# Patient Record
Sex: MALE | Race: WHITE | Employment: UNEMPLOYED | URBAN - METROPOLITAN AREA
[De-identification: names, ages, dates, MRNs, and addresses within clinical notes are randomized per-mention and may not be internally consistent; named-entity substitution may affect disease eponyms.]

---

## 2018-04-19 ENCOUNTER — OFFICE VISIT (OUTPATIENT)
Dept: FAMILY MEDICINE CLINIC | Facility: CLINIC | Age: 23
End: 2018-04-19

## 2018-04-19 VITALS
BODY MASS INDEX: 24.08 KG/M2 | SYSTOLIC BLOOD PRESSURE: 112 MMHG | WEIGHT: 172 LBS | HEART RATE: 96 BPM | TEMPERATURE: 98 F | HEIGHT: 71 IN | DIASTOLIC BLOOD PRESSURE: 76 MMHG | OXYGEN SATURATION: 96 %

## 2018-04-19 DIAGNOSIS — G47.10 HYPERSOMNIA: Primary | ICD-10-CM

## 2018-04-19 DIAGNOSIS — D58.2 ELEVATED HEMOGLOBIN (HCC): ICD-10-CM

## 2018-04-19 DIAGNOSIS — D72.10 EOSINOPHILIA: ICD-10-CM

## 2018-04-19 DIAGNOSIS — F90.8 ATTENTION DEFICIT HYPERACTIVITY DISORDER (ADHD), OTHER TYPE: ICD-10-CM

## 2018-04-19 DIAGNOSIS — A44.9 BARTONELLA INFECTION: ICD-10-CM

## 2018-04-19 DIAGNOSIS — R53.83 OTHER FATIGUE: ICD-10-CM

## 2018-04-19 DIAGNOSIS — F41.9 ANXIETY: ICD-10-CM

## 2018-04-19 PROBLEM — F98.8 ATTENTION DEFICIT DISORDER: Status: ACTIVE | Noted: 2018-04-19

## 2018-04-19 PROCEDURE — 99203 OFFICE O/P NEW LOW 30 MIN: CPT | Performed by: FAMILY MEDICINE

## 2018-04-19 RX ORDER — MIRTAZAPINE 15 MG/1
15 TABLET, FILM COATED ORAL NIGHTLY
COMMUNITY
End: 2018-11-20

## 2018-04-19 RX ORDER — BUSPIRONE HYDROCHLORIDE 10 MG/1
20 TABLET ORAL 2 TIMES DAILY
COMMUNITY
End: 2018-10-19 | Stop reason: ALTCHOICE

## 2018-04-19 RX ORDER — MAGNESIUM 200 MG
200 TABLET ORAL 3 TIMES DAILY
COMMUNITY

## 2018-04-19 RX ORDER — WITCH HAZEL 50 %
500 PADS, MEDICATED (EA) TOPICAL 2 TIMES DAILY
COMMUNITY
End: 2021-11-02

## 2018-04-19 RX ORDER — CHOLECALCIFEROL (VITAMIN D3) 50 MCG
2000 TABLET ORAL ONCE
COMMUNITY

## 2018-04-19 RX ORDER — AMOXICILLIN 500 MG/1
500 CAPSULE ORAL AS NEEDED
COMMUNITY
End: 2018-10-19 | Stop reason: ALTCHOICE

## 2018-04-19 RX ORDER — HYDROCORTISONE 5 MG/1
5 TABLET ORAL 2 TIMES DAILY
COMMUNITY
End: 2018-10-19 | Stop reason: ALTCHOICE

## 2018-04-19 RX ORDER — OXCARBAZEPINE 150 MG/1
150 TABLET, FILM COATED ORAL 2 TIMES DAILY
COMMUNITY
End: 2018-12-12

## 2018-04-19 RX ORDER — SERTRALINE HYDROCHLORIDE 100 MG/1
150 TABLET, FILM COATED ORAL DAILY
COMMUNITY
End: 2018-11-20

## 2018-04-19 RX ORDER — LEVOTHYROXINE SODIUM 0.1 MG/1
5 TABLET ORAL DAILY
COMMUNITY
End: 2018-10-19

## 2018-04-19 RX ORDER — CLONAZEPAM 1 MG/1
1 TABLET ORAL NIGHTLY
COMMUNITY
End: 2018-10-19 | Stop reason: ALTCHOICE

## 2018-04-19 RX ORDER — GABAPENTIN 100 MG/1
600 CAPSULE ORAL 3 TIMES DAILY
COMMUNITY
End: 2018-11-20

## 2018-04-19 RX ORDER — CYANOCOBALAMIN 1000 UG/ML
1000 INJECTION INTRAMUSCULAR; SUBCUTANEOUS
COMMUNITY

## 2018-04-19 RX ORDER — MULTIVIT WITH MINERALS/LUTEIN
1000 TABLET ORAL DAILY
COMMUNITY

## 2018-04-19 RX ORDER — LORAZEPAM 0.5 MG/1
0.5 TABLET ORAL 3 TIMES DAILY
COMMUNITY
End: 2018-12-12

## 2018-04-19 NOTE — PROGRESS NOTES
HPI: 23year old male presents for f/u on anxiety, ADD, hypersomnia, Bartonella infection (NP). VSS.  Sees Pulmonology: Dr. Troy Ferreira for hypersomnia-thought to be 2/t multifactorial causes (depression/inadequate sleep time, poor sleep hygeine)-last see SYSTEMS:  As stated above in the HPI, significant for intermittent fever/chills, mood swings, fatigue.  Patient has otherwise been well without abd pain, C.P., SOB, cough/wheezing, runny nose or frequent sneezing/itchy eyes, heartburn, polyuria, polydipsia,

## 2018-04-19 NOTE — PATIENT INSTRUCTIONS
PLAN:  -referral to Hematology:  Dr. Cynthia Seay  [phone]   [place]  Phone: [phone]  Fax: [phone]    -f/u as needed

## 2018-04-24 ENCOUNTER — HOSPITAL ENCOUNTER (EMERGENCY)
Facility: HOSPITAL | Age: 23
Discharge: STILL ADMITTED OR ADMINISTRATIVE | End: 2018-04-24
Payer: COMMERCIAL

## 2018-04-25 ENCOUNTER — TELEPHONE (OUTPATIENT)
Dept: FAMILY MEDICINE CLINIC | Facility: CLINIC | Age: 23
End: 2018-04-25

## 2018-04-25 NOTE — TELEPHONE ENCOUNTER
Pt's  fax blood test results.  I did forward them to Dr Shari Walter with Pt verbal permission  L:[phone]

## 2018-04-27 ENCOUNTER — OFFICE VISIT (OUTPATIENT)
Dept: HEMATOLOGY/ONCOLOGY | Facility: HOSPITAL | Age: 23
End: 2018-04-27
Attending: INTERNAL MEDICINE
Payer: COMMERCIAL

## 2018-04-27 ENCOUNTER — LAB ENCOUNTER (OUTPATIENT)
Dept: LAB | Facility: HOSPITAL | Age: 23
End: 2018-04-27
Attending: INTERNAL MEDICINE
Payer: COMMERCIAL

## 2018-04-27 ENCOUNTER — HOSPITAL ENCOUNTER (OUTPATIENT)
Dept: GENERAL RADIOLOGY | Facility: HOSPITAL | Age: 23
Discharge: HOME | End: 2018-04-27
Attending: INTERNAL MEDICINE
Payer: COMMERCIAL

## 2018-04-27 VITALS
DIASTOLIC BLOOD PRESSURE: 69 MMHG | WEIGHT: 174 LBS | BODY MASS INDEX: 24.36 KG/M2 | TEMPERATURE: 98 F | HEIGHT: 71 IN | HEART RATE: 92 BPM | SYSTOLIC BLOOD PRESSURE: 115 MMHG | RESPIRATION RATE: 18 BRPM

## 2018-04-27 DIAGNOSIS — D75.1 ERYTHROCYTOSIS: ICD-10-CM

## 2018-04-27 DIAGNOSIS — D75.1 ERYTHROCYTOSIS: Primary | ICD-10-CM

## 2018-04-27 PROCEDURE — 85060 BLOOD SMEAR INTERPRETATION: CPT

## 2018-04-27 PROCEDURE — 85025 COMPLETE CBC W/AUTO DIFF WBC: CPT

## 2018-04-27 PROCEDURE — 82668 ASSAY OF ERYTHROPOIETIN: CPT

## 2018-04-27 PROCEDURE — 84403 ASSAY OF TOTAL TESTOSTERONE: CPT

## 2018-04-27 PROCEDURE — 81270 JAK2 GENE: CPT

## 2018-04-27 PROCEDURE — 71046 X-RAY EXAM CHEST 2 VIEWS: CPT | Performed by: INTERNAL MEDICINE

## 2018-04-27 PROCEDURE — 99244 OFF/OP CNSLTJ NEW/EST MOD 40: CPT | Performed by: INTERNAL MEDICINE

## 2018-04-27 PROCEDURE — 84402 ASSAY OF FREE TESTOSTERONE: CPT

## 2018-04-27 PROCEDURE — 81003 URINALYSIS AUTO W/O SCOPE: CPT

## 2018-04-27 PROCEDURE — 36415 COLL VENOUS BLD VENIPUNCTURE: CPT

## 2018-04-27 PROCEDURE — 80053 COMPREHEN METABOLIC PANEL: CPT

## 2018-04-27 PROCEDURE — 85045 AUTOMATED RETICULOCYTE COUNT: CPT

## 2018-04-27 RX ORDER — ARTEMETHER/LUMEFANTRINE 20MG-120MG
TABLET ORAL
COMMUNITY
Start: 2018-04-23 | End: 2018-12-12

## 2018-04-27 NOTE — PROGRESS NOTES
Hematology Consultation Note    Patient Name: Luis Herman   YOB: 1995   Medical Record Number: [id number]   CSN: [id number]   Consulting Physician: Cynthia Seay MD  Referring Physician(s): Sharon Guo  Date of Consultation: 4/27/201 Clotting Disorder Neg        Social History:    Social History  Social History   Marital status: Single  Spouse name: N/A    Years of education: N/A  Number of children: N/A     Occupational History  None on file     Social History Main Topics   Smoking st is alert and oriented x 3, not in acute distress. Psych: Cooperative with appropriate questions and responses. HEENT: EOMs intact. Oropharynx is clear. Neck:  No palpable lymphadenopathy. Neck is supple. Lymphatics:  There is no palpable lymphadenopath erythropoietin by checking UA and chest x-ray as well as erythropoietin level    --We are checking reticulocyte count to determine if his bone marrow is overactive and producing more ertyhrocytes  --Informed patient we will check for potential myeloprolife

## 2018-05-11 ENCOUNTER — APPOINTMENT (OUTPATIENT)
Dept: LAB | Facility: HOSPITAL | Age: 23
End: 2018-05-11
Attending: INTERNAL MEDICINE
Payer: COMMERCIAL

## 2018-05-11 ENCOUNTER — OFFICE VISIT (OUTPATIENT)
Dept: HEMATOLOGY/ONCOLOGY | Facility: HOSPITAL | Age: 23
End: 2018-05-11
Attending: INTERNAL MEDICINE
Payer: COMMERCIAL

## 2018-05-11 VITALS
DIASTOLIC BLOOD PRESSURE: 59 MMHG | TEMPERATURE: 98 F | RESPIRATION RATE: 16 BRPM | WEIGHT: 176 LBS | SYSTOLIC BLOOD PRESSURE: 121 MMHG | BODY MASS INDEX: 24.64 KG/M2 | HEIGHT: 71 IN | HEART RATE: 82 BPM

## 2018-05-11 DIAGNOSIS — D75.1 ERYTHROCYTOSIS: ICD-10-CM

## 2018-05-11 DIAGNOSIS — D75.1 ERYTHROCYTOSIS: Primary | ICD-10-CM

## 2018-05-11 PROCEDURE — 83615 LACTATE (LD) (LDH) ENZYME: CPT

## 2018-05-11 PROCEDURE — 84550 ASSAY OF BLOOD/URIC ACID: CPT

## 2018-05-11 PROCEDURE — 36415 COLL VENOUS BLD VENIPUNCTURE: CPT

## 2018-05-11 PROCEDURE — 99214 OFFICE O/P EST MOD 30 MIN: CPT | Performed by: INTERNAL MEDICINE

## 2018-05-11 NOTE — PROGRESS NOTES
Hematology Progress Note    Patient Name: Shery Blizzard   YOB: 1995   Medical Record Number: [id number]   CSN: [id number]  Consulting Physician: Shima Jones MD  Referring Physician(s): Olga Fagan  Date of Visit: 5/11/2018     Reason Medical History:  Family History   Problem Relation Age of Onset   • Anemia Mother    • Breast Cancer Paternal Grandmother    • Cancer Paternal Cousin Female      etiology unclear; involving her eye   • Bleeding Disorders Neg    • Clotting Disorder Neg Injection Solution Inject 1,000 mcg into the muscle twice a week. Disp:  Rfl:    5-Methyltetrahydrofolate (METHYL FOLATE) Does not apply Powder by Does not apply route 2 (two) times daily.  Disp:  Rfl:    Multiple Vitamins-Minerals (Joe Ceballos) Oral Tab [facility] Cooperative with appropriate questions and responses. HEENT: EOMs intact. Oropharynx is clear. Neck:  No palpable lymphadenopathy. Neck is supple. Lymphatics:  There is no palpable lymphadenopathy throughout in the cervical, supraclavicular, axillary, o eosinophils; which is likely secondary to the host of medications this patient is being treated with including steroids  --NO signs of potential masses which could be secreting erythropoietin via UA and chest x-ray as well as erythropoietin level is within general treatment was explained to the patient and the family. Crispin López MD  [place] [address] [facility]  [address], [address]

## 2018-10-19 ENCOUNTER — OFFICE VISIT (OUTPATIENT)
Dept: FAMILY MEDICINE CLINIC | Facility: CLINIC | Age: 23
End: 2018-10-19
Payer: COMMERCIAL

## 2018-10-19 VITALS
SYSTOLIC BLOOD PRESSURE: 110 MMHG | WEIGHT: 168.19 LBS | HEIGHT: 71 IN | BODY MASS INDEX: 23.55 KG/M2 | HEART RATE: 99 BPM | DIASTOLIC BLOOD PRESSURE: 64 MMHG | TEMPERATURE: 98 F | RESPIRATION RATE: 17 BRPM

## 2018-10-19 DIAGNOSIS — R53.83 OTHER FATIGUE: Primary | ICD-10-CM

## 2018-10-19 DIAGNOSIS — A44.9 BARTONELLA INFECTION: ICD-10-CM

## 2018-10-19 DIAGNOSIS — D75.1 ERYTHROCYTOSIS: ICD-10-CM

## 2018-10-19 DIAGNOSIS — R79.89 ABNORMAL TSH: ICD-10-CM

## 2018-10-19 DIAGNOSIS — F41.9 ANXIETY: ICD-10-CM

## 2018-10-19 DIAGNOSIS — F39 MOOD DISORDER (HCC): ICD-10-CM

## 2018-10-19 PROCEDURE — 99214 OFFICE O/P EST MOD 30 MIN: CPT | Performed by: FAMILY MEDICINE

## 2018-10-19 RX ORDER — FLUTICASONE PROPIONATE 0.05 MG/G
OINTMENT TOPICAL 2 TIMES DAILY
COMMUNITY
End: 2018-12-12

## 2018-10-19 RX ORDER — LEVOTHYROXINE SODIUM 0.05 MG/1
50 TABLET ORAL DAILY
Qty: 90 TABLET | Refills: 0 | COMMUNITY
Start: 2018-10-19 | End: 2018-12-12

## 2018-10-19 RX ORDER — LIOTHYRONINE SODIUM 5 UG/1
5 TABLET ORAL DAILY
Qty: 90 TABLET | Refills: 0 | COMMUNITY
Start: 2018-10-19 | End: 2018-12-12

## 2018-10-19 NOTE — PATIENT INSTRUCTIONS
PLAN:  -check thyroid function    -referral to Integrative Medicine (Dr. Leo Nunn)    -I recommend Dr. Palak Allen (Psychiatry) in [facility]    -taper down off oral Hydrocortisone  -check labs 1 week after you have stopped Hydrocortisone completely: CBC/CMP

## 2018-10-19 NOTE — PROGRESS NOTES
HPI: 23year old male presents c/o Patient presents with:  Fatigue  Follow - Up  Psychiatric Problem: Pt would like to discuss changing psychiatric provider    VSS. F/u on erythrocytosis-patient saw Heme-w/u negative-discussed with patient.  Latest labs pat polydipsia, heat or cold intolerance, recent weight changes, rashes, lesions. Further review is otherwise negative.      10/19/18  1537   BP: 110/64   Pulse: 99   Resp: 17   Temp: 98.1 °F (36.7 °C)   TempSrc: Oral   Weight: 168 lb 3.2 oz   Height: 71\" after you have stopped Hydrocortisone completely: CBC/CMP    -f/u as needed

## 2018-11-19 ENCOUNTER — LAB ENCOUNTER (OUTPATIENT)
Dept: LAB | Age: 23
End: 2018-11-19
Attending: FAMILY MEDICINE
Payer: COMMERCIAL

## 2018-11-19 ENCOUNTER — TELEPHONE (OUTPATIENT)
Dept: FAMILY MEDICINE CLINIC | Facility: CLINIC | Age: 23
End: 2018-11-19

## 2018-11-19 DIAGNOSIS — R79.89 ABNORMAL TSH: ICD-10-CM

## 2018-11-19 DIAGNOSIS — D58.2 ELEVATED HEMOGLOBIN (HCC): ICD-10-CM

## 2018-11-19 DIAGNOSIS — R53.83 OTHER FATIGUE: Primary | ICD-10-CM

## 2018-11-19 DIAGNOSIS — R53.83 OTHER FATIGUE: ICD-10-CM

## 2018-11-19 DIAGNOSIS — D72.10 EOSINOPHILIA: ICD-10-CM

## 2018-11-19 PROCEDURE — 85025 COMPLETE CBC W/AUTO DIFF WBC: CPT

## 2018-11-19 PROCEDURE — 80053 COMPREHEN METABOLIC PANEL: CPT

## 2018-11-19 PROCEDURE — 84439 ASSAY OF FREE THYROXINE: CPT

## 2018-11-19 PROCEDURE — 82533 TOTAL CORTISOL: CPT

## 2018-11-19 PROCEDURE — 84443 ASSAY THYROID STIM HORMONE: CPT

## 2018-11-19 PROCEDURE — 36415 COLL VENOUS BLD VENIPUNCTURE: CPT

## 2018-11-19 NOTE — TELEPHONE ENCOUNTER
Called patient, he states he would like this checked per Dr. Jaquelyn Bosworth recommendation;  Patient previously taking po hydrocortisone, now weaning off.     Per lov 10/19/2018  PLAN:  -check thyroid function     -referral to Integrative Medicine (Dr. Yariel Holt)

## 2018-11-20 PROBLEM — G47.10 HYPERSOMNIA WITH SLEEP APNEA: Status: ACTIVE | Noted: 2018-04-19

## 2018-11-20 PROBLEM — G47.30 HYPERSOMNIA WITH SLEEP APNEA: Status: ACTIVE | Noted: 2018-04-19

## 2018-11-26 ENCOUNTER — TELEPHONE (OUTPATIENT)
Dept: FAMILY MEDICINE CLINIC | Facility: CLINIC | Age: 23
End: 2018-11-26

## 2018-11-26 NOTE — TELEPHONE ENCOUNTER
Spoke to pt, informed him of lab results and recommendation to have additional cortisol test between 8-9am; pt verbalizes understanding and will have lab drawn between 8-9am.

## 2018-11-26 NOTE — TELEPHONE ENCOUNTER
----- Message from Shelli Loving DO sent at 11/20/2018  3:07 PM CST -----  Nml TFTs/CMP (nonfasting)    CBC: Hgb elevated-persistent.  Dr. Isidro Goodwin ([facility]) managing    Cortisol-drawn at 5pm-please have patient return for redraw b/t 8-9am

## 2018-12-12 ENCOUNTER — OFFICE VISIT (OUTPATIENT)
Dept: FAMILY MEDICINE CLINIC | Facility: CLINIC | Age: 23
End: 2018-12-12
Payer: COMMERCIAL

## 2018-12-12 VITALS
WEIGHT: 170 LBS | BODY MASS INDEX: 23.8 KG/M2 | HEIGHT: 71 IN | OXYGEN SATURATION: 98 % | TEMPERATURE: 98 F | HEART RATE: 87 BPM | DIASTOLIC BLOOD PRESSURE: 80 MMHG | SYSTOLIC BLOOD PRESSURE: 110 MMHG

## 2018-12-12 DIAGNOSIS — F41.9 ANXIETY: ICD-10-CM

## 2018-12-12 DIAGNOSIS — R79.89 ABNORMAL THYROID BLOOD TEST: ICD-10-CM

## 2018-12-12 DIAGNOSIS — F39 MOOD DISORDER (HCC): ICD-10-CM

## 2018-12-12 DIAGNOSIS — R53.83 OTHER FATIGUE: ICD-10-CM

## 2018-12-12 DIAGNOSIS — D58.2 ELEVATED HEMOGLOBIN (HCC): ICD-10-CM

## 2018-12-12 DIAGNOSIS — R79.89 ELEVATED CORTISOL LEVEL: Primary | ICD-10-CM

## 2018-12-12 PROCEDURE — 99214 OFFICE O/P EST MOD 30 MIN: CPT | Performed by: FAMILY MEDICINE

## 2018-12-12 RX ORDER — MIRTAZAPINE 15 MG/1
TABLET, FILM COATED ORAL
Refills: 2 | COMMUNITY
Start: 2018-11-28 | End: 2021-11-02

## 2018-12-12 RX ORDER — LIOTHYRONINE SODIUM 5 UG/1
5 TABLET ORAL DAILY
Qty: 90 TABLET | Refills: 1 | Status: SHIPPED | OUTPATIENT
Start: 2018-12-12 | End: 2021-11-02

## 2018-12-12 RX ORDER — LEVOTHYROXINE SODIUM 0.05 MG/1
50 TABLET ORAL DAILY
Qty: 90 TABLET | Refills: 1 | Status: SHIPPED | OUTPATIENT
Start: 2018-12-12 | End: 2021-11-02

## 2018-12-12 NOTE — PROGRESS NOTES
HPI: 20year old male presents c/o Patient presents with:  Lab Results    VSS. F/u on labs.  Cortisol level found to be high-will recheck in the AM b/t 8-9am. Says he slept until really late that day, getting the bloodwork done right after he woke up (lab w Supple, symmetrical, trachea midline, nml thyroid: no enlargement/tenderness/nodules. Lungs: Clear to auscultation bilaterally. Heart: Regular rate and rhythm, S1, S2 normal, no murmur, click, rub, or gallop. Abdomen: Soft, nontender.  Bowel sounds norm

## 2018-12-12 NOTE — PATIENT INSTRUCTIONS
PLAN:  -recheck TFTs with T3 given patient is taking Liothyronine with Levothyroxine  -continue thyroid medication for now (refilled)    -continue seeing Psychiatry    -recheck cortisol b/t 8-9am    -consider Integrative Medicine.  Will await cortisol/TFT r

## 2019-01-04 ENCOUNTER — APPOINTMENT (OUTPATIENT)
Dept: LAB | Facility: REFERENCE LAB | Age: 24
End: 2019-01-04
Attending: FAMILY MEDICINE
Payer: COMMERCIAL

## 2019-01-04 DIAGNOSIS — R79.89 ABNORMAL THYROID BLOOD TEST: ICD-10-CM

## 2019-01-04 DIAGNOSIS — R79.89 ELEVATED CORTISOL LEVEL: ICD-10-CM

## 2019-01-04 LAB
CORTIS SERPL-MCNC: 4.4 MCG/DL
T3 SERPL-MCNC: 0.94 NG/ML (ref 0.87–1.78)
T3FREE SERPL-MCNC: 3.11 PG/ML (ref 2.53–4.29)
T4 FREE SERPL-MCNC: 0.76 NG/DL (ref 0.58–1.64)
TSH SERPL-ACNC: 2.22 UIU/ML (ref 0.45–5.33)

## 2019-01-04 PROCEDURE — 84439 ASSAY OF FREE THYROXINE: CPT

## 2019-01-04 PROCEDURE — 84443 ASSAY THYROID STIM HORMONE: CPT

## 2019-01-04 PROCEDURE — 36415 COLL VENOUS BLD VENIPUNCTURE: CPT

## 2019-01-04 PROCEDURE — 82533 TOTAL CORTISOL: CPT

## 2019-01-04 PROCEDURE — 84480 ASSAY TRIIODOTHYRONINE (T3): CPT

## 2019-01-04 PROCEDURE — 84481 FREE ASSAY (FT-3): CPT

## 2019-01-05 PROBLEM — R79.89 ABNORMAL CORTISOL LEVEL: Status: ACTIVE | Noted: 2018-12-12

## 2019-03-15 ENCOUNTER — OFFICE VISIT (OUTPATIENT)
Dept: ENDOCRINOLOGY CLINIC | Facility: CLINIC | Age: 24
End: 2019-03-15
Payer: COMMERCIAL

## 2019-03-15 VITALS
SYSTOLIC BLOOD PRESSURE: 114 MMHG | HEIGHT: 72 IN | HEART RATE: 90 BPM | BODY MASS INDEX: 22.35 KG/M2 | DIASTOLIC BLOOD PRESSURE: 80 MMHG | WEIGHT: 165 LBS

## 2019-03-15 DIAGNOSIS — E27.40 LOW SERUM CORTISOL LEVEL (HCC): Primary | ICD-10-CM

## 2019-03-15 DIAGNOSIS — Z86.39 HISTORY OF THYROID DISORDER: ICD-10-CM

## 2019-03-15 PROCEDURE — 99243 OFF/OP CNSLTJ NEW/EST LOW 30: CPT | Performed by: INTERNAL MEDICINE

## 2019-03-15 NOTE — H&P
New Patient Visit - Diabetes    CONSULT - Reason for Visit:  Diabetes management. Requesting Physician: ***. CHIEF COMPLAINT:  Patient presents with:  Thyroid Problem: Also abnormal cortisol.         HISTORY OF PRESENT ILLNESS:   Marcina Goodell is a 20 Disp:  Rfl:    Saccharomyces boulardii (FLORASTOR OR) daily. Disp:  Rfl:    Polyethylene Glycol 3350 (MIRALAX OR) Take by mouth daily.  Disp:  Rfl:    5-Methyltetrahydrofolate (METHYL FOLATE) Does not apply Powder by Does not apply route 2 (two) times daily cough  Cardiovascular: Negative for:  chest pain, palpitations, orthopnea  GI: Negative for:  abdominal pain, nausea, vomiting, diarrhea, constipation, bleeding  Neurology: Negative for: headache, numbness, weakness,   Genito-Urinary: Negative for: dysuria diarrhea is intolerable. VAZ:  Normal renal and liver function  Counselled regarding risk of hypoglycemia associated with use.      DPP4:  Normal renal function    GLP agonists:  No personal or family history of MEN syndrome  Renal function is normal  Inna Al

## 2019-03-15 NOTE — H&P
New Patient Evaluation - History and Physical    CONSULT - Reason for Visit:  Low serum  Cortisol, h/o hypothyroidism  Requesting Physician: Dr. Emeline Shone:  Patient presents with:  Thyroid Problem: Also abnormal cortisol.         HISTORY O LORazepam 1 MG Oral Tab TAKE 1 TABLET BY MOUTH 3 TIMES A DAY Disp:  Rfl: 2   Sertraline HCl 50 MG Oral Tab TAKE 3 TABLETS BY MOUTH EVERY DAY Disp:  Rfl: 0   LevOCARNitine (L-CARNITINE) 500 MG Oral Tab Take 500 mg by mouth 2 (two) times daily.  Disp:  Rfl: Paternal Cousin Female         etiology unclear; involving her eye   • Bleeding Disorders Neg    • Clotting Disorder Neg        ASSESSMENTS:     REVIEW OF SYSTEMS:  Constitutional: Negative for: weight change, fever, fatigue, cold/heat intolerance  Eyes: N Evaluation of thyroid function    Discussed thyroid structure and function  Unclear if and when diagnosis of hypothyroidism was established  Will stop T3   C/w LT 4  TSH and Free T4 in 6 weeks  We will consider tapering LT 4 if labs stay normal after stopp

## 2019-03-15 NOTE — PATIENT INSTRUCTIONS
Repeat your cortisol level between 7-8 am   You can do this test in the next one week     Please stop the daina thyronine  Continue with the levothyroxine at the current dose  Please repeat your thyroid labs in 6 weeks after changing regimen

## 2019-05-06 ENCOUNTER — APPOINTMENT (OUTPATIENT)
Dept: LAB | Facility: HOSPITAL | Age: 24
End: 2019-05-06
Attending: INTERNAL MEDICINE
Payer: COMMERCIAL

## 2019-05-06 ENCOUNTER — TELEPHONE (OUTPATIENT)
Dept: ENDOCRINOLOGY CLINIC | Facility: CLINIC | Age: 24
End: 2019-05-06

## 2019-05-06 DIAGNOSIS — E03.9 HYPOTHYROIDISM, UNSPECIFIED TYPE: Primary | ICD-10-CM

## 2019-05-06 DIAGNOSIS — E03.9 HYPOTHYROIDISM, UNSPECIFIED TYPE: ICD-10-CM

## 2019-05-06 DIAGNOSIS — Z86.39 HISTORY OF THYROID DISORDER: ICD-10-CM

## 2019-05-06 DIAGNOSIS — E27.40 LOW SERUM CORTISOL LEVEL (HCC): Primary | ICD-10-CM

## 2019-05-06 DIAGNOSIS — E27.40 LOW SERUM CORTISOL LEVEL (HCC): ICD-10-CM

## 2019-05-06 PROCEDURE — 84439 ASSAY OF FREE THYROXINE: CPT

## 2019-05-06 PROCEDURE — 36415 COLL VENOUS BLD VENIPUNCTURE: CPT

## 2019-05-06 PROCEDURE — 84443 ASSAY THYROID STIM HORMONE: CPT

## 2019-05-06 PROCEDURE — 82533 TOTAL CORTISOL: CPT

## 2019-05-06 NOTE — TELEPHONE ENCOUNTER
RN spoke with Jenise Lerma who confirmed order was placed correctly and will be tested as part of other tests from this morning.

## 2019-05-06 NOTE — TELEPHONE ENCOUNTER
Spoke w/ Prince meyer. He verbalizes understanding of lab results. Discussed ACTH stim test and he is agreeable. Will fax order to infusion center.  RN did advise that he call us if he does not hear from infusion center within 1-2 weeks for scheduling test.

## 2019-05-06 NOTE — TELEPHONE ENCOUNTER
Thyroid labs are normal.   Patient had also seen me for evaluation of low serum cortisol  We had repeat it and it is borderline 6.8. We can do a ACTH stim test.   This test assesses response of AG to ACTH.  Normally, gland should increase cortisol product

## 2019-05-06 NOTE — TELEPHONE ENCOUNTER
Pt had labs done -= free T4 order has  - pls put new order in - pt already had blood drawn - pls call Dimple at [phone]

## 2019-05-07 PROBLEM — E27.40 LOW SERUM CORTISOL LEVEL (HCC): Status: ACTIVE | Noted: 2019-05-07

## 2019-05-07 PROBLEM — R79.89 LOW SERUM CORTISOL LEVEL: Status: ACTIVE | Noted: 2019-05-07

## 2019-05-08 ENCOUNTER — TELEPHONE (OUTPATIENT)
Dept: HEMATOLOGY/ONCOLOGY | Facility: HOSPITAL | Age: 24
End: 2019-05-08

## 2019-05-16 ENCOUNTER — OFFICE VISIT (OUTPATIENT)
Dept: HEMATOLOGY/ONCOLOGY | Facility: HOSPITAL | Age: 24
End: 2019-05-16
Attending: INTERNAL MEDICINE
Payer: COMMERCIAL

## 2019-05-16 VITALS
SYSTOLIC BLOOD PRESSURE: 112 MMHG | RESPIRATION RATE: 16 BRPM | HEART RATE: 73 BPM | TEMPERATURE: 98 F | OXYGEN SATURATION: 96 % | DIASTOLIC BLOOD PRESSURE: 73 MMHG

## 2019-05-16 DIAGNOSIS — E27.40 LOW SERUM CORTISOL LEVEL (HCC): Primary | ICD-10-CM

## 2019-05-16 PROCEDURE — 82533 TOTAL CORTISOL: CPT

## 2019-05-16 PROCEDURE — 82024 ASSAY OF ACTH: CPT

## 2019-05-16 PROCEDURE — 96374 THER/PROPH/DIAG INJ IV PUSH: CPT

## 2019-05-16 PROCEDURE — 99211 OFF/OP EST MAY X REQ PHY/QHP: CPT

## 2019-05-16 RX ORDER — 0.9 % SODIUM CHLORIDE 0.9 %
VIAL (ML) INJECTION
Status: DISCONTINUED
Start: 2019-05-16 | End: 2019-05-16

## 2019-05-16 RX ORDER — COSYNTROPIN 0.25 MG/ML
0.25 INJECTION, POWDER, FOR SOLUTION INTRAMUSCULAR; INTRAVENOUS ONCE
Status: CANCELLED | OUTPATIENT
Start: 2019-05-16

## 2019-05-16 RX ORDER — COSYNTROPIN 0.25 MG/ML
INJECTION, POWDER, FOR SOLUTION INTRAMUSCULAR; INTRAVENOUS
Status: COMPLETED
Start: 2019-05-16 | End: 2019-05-16

## 2019-05-16 RX ORDER — COSYNTROPIN 0.25 MG/ML
0.25 INJECTION, POWDER, FOR SOLUTION INTRAMUSCULAR; INTRAVENOUS ONCE
Status: COMPLETED | OUTPATIENT
Start: 2019-05-16 | End: 2019-05-16

## 2019-05-16 RX ADMIN — COSYNTROPIN 0.25 MG: 0.25 INJECTION, POWDER, FOR SOLUTION INTRAMUSCULAR; INTRAVENOUS at 08:09:00

## 2019-05-16 NOTE — PROGRESS NOTES
Pt to infusion for ACTH stimulation test. Pt arrived ambulating independently. Test explained to patient, who verbalizes understanding. PIV started in [facility] x1 attempt. Baseline cortisol and acth collected.   Cortrosyn given ivp over 2 minutes and tolera

## 2019-07-16 ENCOUNTER — TELEPHONE (OUTPATIENT)
Dept: ENDOCRINOLOGY CLINIC | Facility: CLINIC | Age: 24
End: 2019-07-16

## 2019-07-16 DIAGNOSIS — E03.9 HYPOTHYROIDISM, UNSPECIFIED TYPE: Primary | ICD-10-CM

## 2019-07-16 NOTE — TELEPHONE ENCOUNTER
Regarding: RE: Non-Urgent Medical Question  ----- Message from Edward Araya RN sent at 7/16/2019  2:03 PM CDT -----       ----- Message from Kd Barrera to Luis Díaz MD sent at 7/16/2019  1:30 PM -----   Hi Dr. Carlos Wallace,    I have been on

## 2019-07-24 ENCOUNTER — TELEPHONE (OUTPATIENT)
Dept: ENDOCRINOLOGY CLINIC | Facility: CLINIC | Age: 24
End: 2019-07-24

## 2019-07-24 DIAGNOSIS — E03.9 HYPOTHYROIDISM, UNSPECIFIED TYPE: Primary | ICD-10-CM

## 2019-07-24 LAB
T4, FREE: 1 NG/DL (ref 0.8–1.8)
TSH: 3.54 MIU/L (ref 0.4–4.5)

## 2019-07-24 NOTE — TELEPHONE ENCOUNTER
We have been trying to taper off LT4  He is on LT 4 25 mcg daily  Recent thyroid labs are normal  Hence, we can stop LT4 completely  Repeat TSH and Free T4 in 6 weeks  Call for results  Call if he develops symptoms of hypothyroidism: weight gain, fatigue,

## 2021-11-02 ENCOUNTER — OFFICE VISIT (OUTPATIENT)
Dept: NEUROLOGY | Facility: CLINIC | Age: 26
End: 2021-11-02
Payer: COMMERCIAL

## 2021-11-02 VITALS
WEIGHT: 165 LBS | RESPIRATION RATE: 16 BRPM | DIASTOLIC BLOOD PRESSURE: 74 MMHG | SYSTOLIC BLOOD PRESSURE: 108 MMHG | HEART RATE: 81 BPM | HEIGHT: 72 IN | BODY MASS INDEX: 22.35 KG/M2

## 2021-11-02 DIAGNOSIS — I49.8 POTS (POSTURAL ORTHOSTATIC TACHYCARDIA SYNDROME): ICD-10-CM

## 2021-11-02 DIAGNOSIS — M79.603 UPPER EXTREMITY PAIN, DIFFUSE, UNSPECIFIED LATERALITY: Primary | ICD-10-CM

## 2021-11-02 DIAGNOSIS — R53.82 CHRONIC FATIGUE SYNDROME: ICD-10-CM

## 2021-11-02 PROCEDURE — 3078F DIAST BP <80 MM HG: CPT | Performed by: OTHER

## 2021-11-02 PROCEDURE — 3074F SYST BP LT 130 MM HG: CPT | Performed by: OTHER

## 2021-11-02 PROCEDURE — 3008F BODY MASS INDEX DOCD: CPT | Performed by: OTHER

## 2021-11-02 PROCEDURE — 99205 OFFICE O/P NEW HI 60 MIN: CPT | Performed by: OTHER

## 2021-11-23 ENCOUNTER — OFFICE VISIT (OUTPATIENT)
Dept: NEUROLOGY | Facility: CLINIC | Age: 26
End: 2021-11-23
Payer: COMMERCIAL

## 2021-11-23 VITALS
DIASTOLIC BLOOD PRESSURE: 67 MMHG | HEART RATE: 78 BPM | RESPIRATION RATE: 18 BRPM | WEIGHT: 165 LBS | BODY MASS INDEX: 22.35 KG/M2 | SYSTOLIC BLOOD PRESSURE: 112 MMHG | HEIGHT: 72 IN

## 2021-11-23 DIAGNOSIS — G56.03 BILATERAL CARPAL TUNNEL SYNDROME: ICD-10-CM

## 2021-11-23 DIAGNOSIS — M77.11 LATERAL EPICONDYLITIS OF BOTH ELBOWS: Primary | ICD-10-CM

## 2021-11-23 DIAGNOSIS — M77.12 LATERAL EPICONDYLITIS OF BOTH ELBOWS: Primary | ICD-10-CM

## 2021-11-23 PROCEDURE — 3008F BODY MASS INDEX DOCD: CPT | Performed by: OTHER

## 2021-11-23 PROCEDURE — 99214 OFFICE O/P EST MOD 30 MIN: CPT | Performed by: OTHER

## 2021-11-23 PROCEDURE — 3074F SYST BP LT 130 MM HG: CPT | Performed by: OTHER

## 2021-11-23 PROCEDURE — 3078F DIAST BP <80 MM HG: CPT | Performed by: OTHER

## 2021-11-23 RX ORDER — ALPHA LIPOIC ACID 300 MG
CAPSULE ORAL
COMMUNITY
Start: 2021-10-31

## 2021-11-23 NOTE — PROGRESS NOTES
Neurology Note    11/23/2021  3:06 PM    Alysa Newsome  27year old, male  11/5/1995  Chief Complaint: arm pain and loss of dexterity in the hands    HPI/Subjective:    27yo who was last seen 11/2 for arm pain and loss of dexterity in the hands.  I omid Glycol 3350 (MIRALAX OR), Take by mouth daily. , Disp: , Rfl:     Patient Active Problem List:     Anxiety     Attention deficit disorder     Hypersomnia with sleep apnea     Bartonella infection     Eosinophilia     E[facility] hemoglobin ([facility]     Other fati

## 2021-12-30 ENCOUNTER — ORDER TRANSCRIPTION (OUTPATIENT)
Dept: PHYSICAL THERAPY | Facility: HOSPITAL | Age: 26
End: 2021-12-30

## 2021-12-30 DIAGNOSIS — M77.12 LATERAL EPICONDYLITIS OF BOTH ELBOWS: Primary | ICD-10-CM

## 2021-12-30 DIAGNOSIS — M77.11 LATERAL EPICONDYLITIS OF BOTH ELBOWS: Primary | ICD-10-CM

## 2022-01-05 ENCOUNTER — TELEPHONE (OUTPATIENT)
Dept: PHYSICAL THERAPY | Facility: HOSPITAL | Age: 27
End: 2022-01-05

## 2022-01-05 ENCOUNTER — OFFICE VISIT (OUTPATIENT)
Dept: PHYSICAL THERAPY | Age: 27
End: 2022-01-05
Attending: Other
Payer: COMMERCIAL

## 2022-01-05 DIAGNOSIS — M77.12 LATERAL EPICONDYLITIS OF BOTH ELBOWS: ICD-10-CM

## 2022-01-05 DIAGNOSIS — M77.11 LATERAL EPICONDYLITIS OF BOTH ELBOWS: ICD-10-CM

## 2022-01-05 PROCEDURE — 97163 PT EVAL HIGH COMPLEX 45 MIN: CPT | Performed by: PHYSICAL THERAPIST

## 2022-01-05 PROCEDURE — 97110 THERAPEUTIC EXERCISES: CPT | Performed by: PHYSICAL THERAPIST

## 2022-01-05 NOTE — PROGRESS NOTES
ELBOW AND HAND EVALUATION:   Referring Physician: Dr. Haider Powers  Diagnosis: Lateral epicondylitis of both elbows (M77.11,M77.12)       Date of Service: 1/5/2022     PATIENT SUMMARY   Gigi Grant is a 27year old male who presents to therapy today diagnosis of lateral epicondylitis in conjunction with chronic fatigue syndrome/generalized deconditioning. Pt and PT discussed evaluation findings, pathology, POC and HEP. Pt voiced understanding and performs HEP correctly without reported pain.  Skilled gradual wrist and elbow strengthening, shoulder ROM.  Pt already is performing stretching routine for wrists/elbows    Charges: PT Eval High Complexity, TE1      Total Timed Treatment: 11 min     Total Treatment Time: 50 min     Based on clinical rationale treatment and while under my care.     X___________________________________________________ Date____________________    Certification From: 0/8/5751  To:4/5/2022

## 2022-01-10 ENCOUNTER — APPOINTMENT (OUTPATIENT)
Dept: PHYSICAL THERAPY | Age: 27
End: 2022-01-10
Attending: Other
Payer: COMMERCIAL

## 2022-01-17 ENCOUNTER — TELEPHONE (OUTPATIENT)
Dept: PHYSICAL THERAPY | Facility: HOSPITAL | Age: 27
End: 2022-01-17

## 2022-01-19 ENCOUNTER — APPOINTMENT (OUTPATIENT)
Dept: PHYSICAL THERAPY | Age: 27
End: 2022-01-19
Attending: Other
Payer: COMMERCIAL

## 2022-01-26 ENCOUNTER — APPOINTMENT (OUTPATIENT)
Dept: PHYSICAL THERAPY | Age: 27
End: 2022-01-26
Attending: Other
Payer: COMMERCIAL

## 2022-02-16 ENCOUNTER — OFFICE VISIT (OUTPATIENT)
Dept: PHYSICAL THERAPY | Age: 27
End: 2022-02-16
Attending: Other
Payer: COMMERCIAL

## 2022-02-16 PROCEDURE — 97110 THERAPEUTIC EXERCISES: CPT | Performed by: PHYSICAL THERAPIST

## 2022-02-23 ENCOUNTER — OFFICE VISIT (OUTPATIENT)
Dept: PHYSICAL THERAPY | Age: 27
End: 2022-02-23
Attending: Other
Payer: COMMERCIAL

## 2022-02-23 PROCEDURE — 97110 THERAPEUTIC EXERCISES: CPT | Performed by: PHYSICAL THERAPIST

## 2022-03-02 ENCOUNTER — APPOINTMENT (OUTPATIENT)
Dept: PHYSICAL THERAPY | Age: 27
End: 2022-03-02
Attending: Other
Payer: COMMERCIAL

## 2022-03-09 ENCOUNTER — APPOINTMENT (OUTPATIENT)
Dept: PHYSICAL THERAPY | Age: 27
End: 2022-03-09
Attending: Other
Payer: COMMERCIAL

## 2022-03-15 ENCOUNTER — APPOINTMENT (OUTPATIENT)
Dept: PHYSICAL THERAPY | Age: 27
End: 2022-03-15
Attending: Other
Payer: COMMERCIAL

## 2022-03-17 ENCOUNTER — APPOINTMENT (OUTPATIENT)
Dept: PHYSICAL THERAPY | Age: 27
End: 2022-03-17
Attending: Other
Payer: COMMERCIAL

## 2023-02-09 ENCOUNTER — OFFICE VISIT (OUTPATIENT)
Dept: NEUROLOGY | Facility: CLINIC | Age: 28
End: 2023-02-09
Payer: COMMERCIAL

## 2023-02-09 VITALS
WEIGHT: 165 LBS | SYSTOLIC BLOOD PRESSURE: 98 MMHG | DIASTOLIC BLOOD PRESSURE: 58 MMHG | BODY MASS INDEX: 22.35 KG/M2 | HEIGHT: 72 IN | RESPIRATION RATE: 16 BRPM | HEART RATE: 86 BPM

## 2023-02-09 DIAGNOSIS — G56.03 BILATERAL CARPAL TUNNEL SYNDROME: Primary | ICD-10-CM

## 2023-02-09 DIAGNOSIS — R29.898 WEAKNESS OF RIGHT FOOT: ICD-10-CM

## 2023-02-09 DIAGNOSIS — R53.83 OTHER FATIGUE: ICD-10-CM

## 2023-02-09 PROCEDURE — 3074F SYST BP LT 130 MM HG: CPT | Performed by: OTHER

## 2023-02-09 PROCEDURE — 3008F BODY MASS INDEX DOCD: CPT | Performed by: OTHER

## 2023-02-09 PROCEDURE — 3078F DIAST BP <80 MM HG: CPT | Performed by: OTHER

## 2023-02-09 PROCEDURE — 99214 OFFICE O/P EST MOD 30 MIN: CPT | Performed by: OTHER

## 2023-02-09 RX ORDER — SERTRALINE HYDROCHLORIDE 100 MG/1
100 TABLET, FILM COATED ORAL DAILY
COMMUNITY
Start: 2023-01-18

## 2023-02-09 RX ORDER — LORAZEPAM 0.5 MG/1
0.75 TABLET ORAL 2 TIMES DAILY
COMMUNITY
Start: 2023-01-30

## 2023-03-04 LAB — CREATINE KINASE, TOTAL: 55 U/L (ref 44–196)

## 2023-03-23 ENCOUNTER — PROCEDURE VISIT (OUTPATIENT)
Dept: NEUROLOGY | Facility: CLINIC | Age: 28
End: 2023-03-23
Payer: COMMERCIAL

## 2023-03-23 DIAGNOSIS — R53.83 OTHER FATIGUE: ICD-10-CM

## 2023-03-23 DIAGNOSIS — R29.898 WEAKNESS OF RIGHT FOOT: ICD-10-CM

## 2023-03-23 DIAGNOSIS — G56.03 BILATERAL CARPAL TUNNEL SYNDROME: Primary | ICD-10-CM

## 2023-03-24 ENCOUNTER — PATIENT MESSAGE (OUTPATIENT)
Dept: NEUROLOGY | Facility: CLINIC | Age: 28
End: 2023-03-24

## 2024-12-19 NOTE — PROGRESS NOTES
Forehead probe was used for testing. No supplemental oxygen was needed with or without activity.     12/19/24 0837   At Rest - Home O2 Evaluation   Room Air - HR 80 beats/min   Room Air - SpO2 98 %   With Activity - Home O2 Evaluation   Room Air -  beats/min   Room Air - SpO2 92 %   Home O2 Evaluation Charging   $ Exercise O2 procedure complete (Pulmonary Stress Test) ([place] Only) Procedure Complete   Home O2 Evaluation Recommendations   Home O2 Required No        Neurology Note    11/2/2021  8:13 AM    Carmelo Schmidt  30year old, male  11/5/1995  Chief Complaint: arm pain, loss of dexterity in hands    HPI/Subjective:    23yo male, PMH of POTS and Chronic fatigue syndrome both diagnosed at [facility] last yea TIMES A DAY, Disp: , Rfl: 2  •  Sertraline HCl 50 MG Oral Tab, TAKE 3 TABLETS BY MOUTH EVERY DAY, Disp: , Rfl: 0  •  Magnesium 200 MG Oral Tab, Take 200 mg by mouth 3 (three) times daily. , Disp: , Rfl:   •  cyanocobalamin 1000 MCG/ML Injection Solution, In other systems reviewed and are negative. General: no acute distress, thin  HEENT: no thyroid masses or cervical lymphadenopathy  CV: exts warm and well-perfused, no edema.    Neuro:  Neurologic Exam     Mental Status   Attention: normal. Concentration: currently managed by another physician, however could speak to an underlying dysautonomia. Return to clinic in 4 - 6 weeks.

## (undated) NOTE — Clinical Note
Thank you for the consult. I saw Mr. Fito Flower in the endocrine/diabetes clinic today. Please see attached my note. Please feel free to contact me with any questions. Thanks!

## (undated) NOTE — LETTER
9/4/2019              Luis Herman        [address] [place], [place] [id number]         Dear [facility] records indicate that the tests ordered for you by Yael Gunter MD  have not been done.   If you have, in fact, already completed the

## (undated) NOTE — LETTER
4/16/2019              Eino Kayser        [address] [place], [place] [id number]         Dear [place] records indicate that the CORTISOL blood test ordered for you by Gucci Chery MD  have not been done.   If you have, in fact, alread